# Patient Record
Sex: FEMALE | Race: WHITE | NOT HISPANIC OR LATINO | Employment: PART TIME | URBAN - METROPOLITAN AREA
[De-identification: names, ages, dates, MRNs, and addresses within clinical notes are randomized per-mention and may not be internally consistent; named-entity substitution may affect disease eponyms.]

---

## 2024-04-19 ENCOUNTER — HOSPITAL ENCOUNTER (EMERGENCY)
Facility: CLINIC | Age: 49
Discharge: HOME | End: 2024-04-19
Attending: EMERGENCY MEDICINE | Admitting: EMERGENCY MEDICINE
Payer: COMMERCIAL

## 2024-04-19 VITALS
DIASTOLIC BLOOD PRESSURE: 87 MMHG | TEMPERATURE: 98.4 F | HEART RATE: 76 BPM | HEIGHT: 67 IN | OXYGEN SATURATION: 100 % | WEIGHT: 130 LBS | RESPIRATION RATE: 20 BRPM | SYSTOLIC BLOOD PRESSURE: 133 MMHG | BODY MASS INDEX: 20.4 KG/M2

## 2024-04-19 DIAGNOSIS — R55 NEAR SYNCOPE: ICD-10-CM

## 2024-04-19 DIAGNOSIS — R42 DIZZINESS: ICD-10-CM

## 2024-04-19 PROBLEM — F51.01 PRIMARY INSOMNIA: Status: ACTIVE | Noted: 2022-03-17

## 2024-04-19 PROBLEM — F41.9 ANXIETY: Status: ACTIVE | Noted: 2021-03-04

## 2024-04-19 LAB
ALBUMIN SERPL BCG-MCNC: 4.4 G/DL (ref 3.5–5.2)
ALP SERPL-CCNC: 42 U/L (ref 40–150)
ALT SERPL W P-5'-P-CCNC: 12 U/L (ref 0–50)
ANION GAP SERPL CALCULATED.3IONS-SCNC: 10 MMOL/L (ref 7–15)
AST SERPL W P-5'-P-CCNC: 21 U/L (ref 0–45)
BASOPHILS # BLD AUTO: 0 10E3/UL (ref 0–0.2)
BASOPHILS NFR BLD AUTO: 1 %
BILIRUB SERPL-MCNC: 0.5 MG/DL
BUN SERPL-MCNC: 10.9 MG/DL (ref 6–20)
CALCIUM SERPL-MCNC: 8.9 MG/DL (ref 8.6–10)
CHLORIDE SERPL-SCNC: 107 MMOL/L (ref 98–107)
CREAT SERPL-MCNC: 0.76 MG/DL (ref 0.51–0.95)
DEPRECATED HCO3 PLAS-SCNC: 23 MMOL/L (ref 22–29)
EGFRCR SERPLBLD CKD-EPI 2021: >90 ML/MIN/1.73M2
EOSINOPHIL # BLD AUTO: 0 10E3/UL (ref 0–0.7)
EOSINOPHIL NFR BLD AUTO: 1 %
ERYTHROCYTE [DISTWIDTH] IN BLOOD BY AUTOMATED COUNT: 12.9 % (ref 10–15)
GLUCOSE SERPL-MCNC: 108 MG/DL (ref 70–99)
HCG SERPL QL: NEGATIVE
HCT VFR BLD AUTO: 40.4 % (ref 35–47)
HGB BLD-MCNC: 13.2 G/DL (ref 11.7–15.7)
HOLD SPECIMEN: NORMAL
HOLD SPECIMEN: NORMAL
IMM GRANULOCYTES # BLD: 0 10E3/UL
IMM GRANULOCYTES NFR BLD: 0 %
LYMPHOCYTES # BLD AUTO: 1.4 10E3/UL (ref 0.8–5.3)
LYMPHOCYTES NFR BLD AUTO: 22 %
MAGNESIUM SERPL-MCNC: 2.1 MG/DL (ref 1.7–2.3)
MCH RBC QN AUTO: 27.6 PG (ref 26.5–33)
MCHC RBC AUTO-ENTMCNC: 32.7 G/DL (ref 31.5–36.5)
MCV RBC AUTO: 84 FL (ref 78–100)
MONOCYTES # BLD AUTO: 0.5 10E3/UL (ref 0–1.3)
MONOCYTES NFR BLD AUTO: 8 %
NEUTROPHILS # BLD AUTO: 4.4 10E3/UL (ref 1.6–8.3)
NEUTROPHILS NFR BLD AUTO: 69 %
NRBC # BLD AUTO: 0 10E3/UL
NRBC BLD AUTO-RTO: 0 /100
PLATELET # BLD AUTO: 192 10E3/UL (ref 150–450)
POTASSIUM SERPL-SCNC: 3.8 MMOL/L (ref 3.4–5.3)
PROT SERPL-MCNC: 6.5 G/DL (ref 6.4–8.3)
RBC # BLD AUTO: 4.79 10E6/UL (ref 3.8–5.2)
SODIUM SERPL-SCNC: 140 MMOL/L (ref 135–145)
TSH SERPL DL<=0.005 MIU/L-ACNC: 1.03 UIU/ML (ref 0.3–4.2)
WBC # BLD AUTO: 6.3 10E3/UL (ref 4–11)

## 2024-04-19 PROCEDURE — 93010 ELECTROCARDIOGRAM REPORT: CPT | Performed by: EMERGENCY MEDICINE

## 2024-04-19 PROCEDURE — 93005 ELECTROCARDIOGRAM TRACING: CPT

## 2024-04-19 PROCEDURE — 84443 ASSAY THYROID STIM HORMONE: CPT | Performed by: EMERGENCY MEDICINE

## 2024-04-19 PROCEDURE — 85048 AUTOMATED LEUKOCYTE COUNT: CPT | Performed by: FAMILY MEDICINE

## 2024-04-19 PROCEDURE — 36415 COLL VENOUS BLD VENIPUNCTURE: CPT | Performed by: FAMILY MEDICINE

## 2024-04-19 PROCEDURE — 83735 ASSAY OF MAGNESIUM: CPT | Performed by: EMERGENCY MEDICINE

## 2024-04-19 PROCEDURE — 99284 EMERGENCY DEPT VISIT MOD MDM: CPT | Mod: 25 | Performed by: EMERGENCY MEDICINE

## 2024-04-19 PROCEDURE — 99284 EMERGENCY DEPT VISIT MOD MDM: CPT

## 2024-04-19 PROCEDURE — 84703 CHORIONIC GONADOTROPIN ASSAY: CPT | Performed by: EMERGENCY MEDICINE

## 2024-04-19 PROCEDURE — 80053 COMPREHEN METABOLIC PANEL: CPT | Performed by: FAMILY MEDICINE

## 2024-04-19 ASSESSMENT — ACTIVITIES OF DAILY LIVING (ADL)
ADLS_ACUITY_SCORE: 35
ADLS_ACUITY_SCORE: 35

## 2024-04-19 ASSESSMENT — COLUMBIA-SUICIDE SEVERITY RATING SCALE - C-SSRS
2. HAVE YOU ACTUALLY HAD ANY THOUGHTS OF KILLING YOURSELF IN THE PAST MONTH?: NO
6. HAVE YOU EVER DONE ANYTHING, STARTED TO DO ANYTHING, OR PREPARED TO DO ANYTHING TO END YOUR LIFE?: NO
1. IN THE PAST MONTH, HAVE YOU WISHED YOU WERE DEAD OR WISHED YOU COULD GO TO SLEEP AND NOT WAKE UP?: NO

## 2024-04-19 NOTE — DISCHARGE INSTRUCTIONS
Your evaluation in the emergency department is reassuring however no cause of your symptoms was identified.  I would like you to follow-up with your primary care provider as we discussed.  If this continues and your heart rate continues to be elevated you may benefit from an outpatient heart monitor which can be worn for several days.  Please return to the emergency department for new or worsening symptoms.  Hope you feel better soon.

## 2024-04-19 NOTE — ED PROVIDER NOTES
History     Chief Complaint   Patient presents with    Dizziness     HPI  Sage Tripathi is a 49 year old female with history of ADHD, anxiety, insomnia, who presents for evaluation of dizziness.  She reports that while she was driving she had a very acute onset of dizziness and felt lightheaded and as if she may pass out.  Felt panicky and anxious and was breathing very fast.  Then was having a tingly feeling in her hands and feet followed by cramping in her hands.  No nausea or vomiting or diaphoresis.  Felt clammy and looked pale in the mirror.  This is never happened before.  Lasted for about 20 minutes.  She was able to pull over to the side of the road and rest.  Still did not feel great but symptoms had lessened.  Was speaking with her boyfriend on the phone and trying to get back on the highway to get an exit to get off the highway.  So she got back on the highway symptoms worsened again and then ultimately called 911 and was brought in by EMS.  Was in her usual state of health at time of onset.  Denies any recent illness.  She takes Adderall and hydroxyzine as needed as well as some vitamin supplements.  No changes in this regimen recently.  She has a smart watch and this did recorded during this time her heart rate was elevated from 100-142.  She says that this corresponded to the time of her symptoms.    The patient's PMHx, Surgical Hx, Allergies, and Medications were all reviewed with the patient.    Allergies:  No Known Allergies    Problem List:    Patient Active Problem List    Diagnosis Date Noted    Primary insomnia 03/17/2022     Priority: Medium     Formatting of this note might be different from the original.   Last Assessment & Plan:     Formatting of this note might be different from the original.    The patient uses hydroxyzine 25 mg at bedtime for treatment of insomnia, no side effects.  At times she will use 50 mg of hydroxyzine.  Hydroxyzine works well to help her sleep.  Will refill  hydroxyzine today.  Return to clinic in 1 year for medication management, sooner if concerns arise.  Last Assessment & Plan:    Formatting of this note might be different from the original.   The patient uses hydroxyzine 25 mg at bedtime for treatment of insomnia, no side effects.  At times she will use 50 mg of hydroxyzine.  Hydroxyzine works well to help her sleep.  Will refill hydroxyzine today.  Return to clinic in 1 year for medication management, sooner if concerns arise.      Anxiety 03/04/2021     Priority: Medium    ADHD, predominantly inattentive type 08/10/2013     Priority: Medium     Formatting of this note might be different from the original.   Formatting of this note might be different from the original.    Legacy Onset Date: 07/28/2011       Last Assessment & Plan:     Formatting of this note might be different from the original.    ADHD symptoms are in good control with Adderall 10 mg BID. She is tolerating the medication well. She took the medication this morning and her BP and HR are stable. The pt states that she has quit marijuana since starting Adderall.  Stimulant agreement signed Jan 2024. Urine drug screen today shows expected results (positive amphetamine, everything else negative).  Will refill Adderall at its current dose regimen for the next 6 months.  Return to clinic in 6 months for medication management, sooner if concerns arise.  Formatting of this note might be different from the original.   Legacy Onset Date: 07/28/2011      Last Assessment & Plan:    Formatting of this note might be different from the original.   ADHD symptoms are in good control with Adderall 10 mg BID. She is tolerating the medication well. She took the medication this morning and her BP and HR are stable. The pt states that she has quit marijuana since starting Adderall.  Stimulant agreement signed Jan 2024. Urine drug screen today shows expected results (positive amphetamine, everything else negative).  Will  "refill Adderall at its current dose regimen for the next 6 months.  Return to clinic in 6 months for medication management, sooner if concerns arise.      Alcohol dependence in remission (H) 02/09/2009     Priority: Medium     Formatting of this note might be different from the original.   Formatting of this note might be different from the original.   Alcohol Dependence Remission          Past Medical History:    No past medical history on file.    Past Surgical History:    No past surgical history on file.    Family History:    No family history on file.    Social History:  Marital Status:          Medications:    No current outpatient medications on file.        Review of Systems  Pertinent positives and negatives mentioned in HPI    Physical Exam   BP: 133/87  Pulse: 78  Temp: 98.4  F (36.9  C)  Resp: 17  Height: 170.2 cm (5' 7\")  Weight: 59 kg (130 lb)  SpO2: 99 %    GEN: Awake, alert, and cooperative.  Appears anxious.  HENT: Atraumatic  EYES: EOM intact. Conjunctiva clear. No discharge.  No nystagmus.  No RAPD.  NECK: Symmetric, freely mobile.   CV : Regular rate and rhythm.  No murmurs appreciated  PULM: Normal effort. No wheezes, rales, or rhonchi bilaterally.  ABD: Soft, non-tender, non-distended. No rebound or guarding.   NEURO: Mental status: Alert, awake. Oriented to self, date, and place. Normal speech and language. GCS 15  Cranial Nerves: II-XII grossly intact  Motor: Follows commands x 4 extremities   Upper Extremities:   RUE: 5/5 shoulder abduction. 5/5 elbow flex/ext. 5/5 wrist flex/ext. 5/5 hand .   LUE: 5/5 shoulder abduction. 5/5 elbow flex/ext. 5/5 wrist flex/ext. 5/5 hand .    Lower Extremities:   RLE: 5/5 hip flexion. 5/5 knee flex/ext. 5/5 ankle plantar-/dorsiflexion. 5/5 EHL.   LLE: 5/5 hip flexion. 5/5 knee flex/ext. 5/5 ankle plantar-/dorsiflexion. 5/5 EHL   Sensory: Sensation intact in all 4 extremities   Coordination: Finger-nose finger intact. Heel-shin intact.   EXT: No gross " deformity. Warm and well perfused.  INT: Warm. No diaphoresis. Normal color.        ED Course        Procedures         Sinus rhythm with rate of 76 bpm.  Normal axis.  Delayed R wave progression.  Biphasic P wave in lead V2 makes me suspect leads placed to superiorly.  No ST segment elevations or depressions.  Normal intervals.  No ectopy.  No prior EKG for comparison.  Impression sinus rhythm with no evidence of dysrhythmia.       Critical Care time:  none               Results for orders placed or performed during the hospital encounter of 04/19/24 (from the past 24 hour(s))   CBC with platelets, differential    Narrative    The following orders were created for panel order CBC with platelets, differential.  Procedure                               Abnormality         Status                     ---------                               -----------         ------                     CBC with platelets and d...[[id number]]                      Final result                 Please view results for these tests on the individual orders.   Comprehensive metabolic panel   Result Value Ref Range    Sodium 140 135 - 145 mmol/L    Potassium 3.8 3.4 - 5.3 mmol/L    Carbon Dioxide (CO2) 23 22 - 29 mmol/L    Anion Gap 10 7 - 15 mmol/L    Urea Nitrogen 10.9 6.0 - 20.0 mg/dL    Creatinine 0.76 0.51 - 0.95 mg/dL    GFR Estimate >90 >60 mL/min/1.73m2    Calcium 8.9 8.6 - 10.0 mg/dL    Chloride 107 98 - 107 mmol/L    Glucose 108 (H) 70 - 99 mg/dL    Alkaline Phosphatase 42 40 - 150 U/L    AST 21 0 - 45 U/L    ALT 12 0 - 50 U/L    Protein Total 6.5 6.4 - 8.3 g/dL    Albumin 4.4 3.5 - 5.2 g/dL    Bilirubin Total 0.5 <=1.2 mg/dL   [place] Draw    Narrative    The following orders were created for panel order [place] Draw.  Procedure                               Abnormality         Status                     ---------                               -----------         ------                     Extra Blue Top Tube[[id number]]                               Final result               Extra Red Top Tube[[id number]]                               Final result                 Please view results for these tests on the individual orders.   CBC with platelets and differential   Result Value Ref Range    WBC Count 6.3 4.0 - 11.0 10e3/uL    RBC Count 4.79 3.80 - 5.20 10e6/uL    Hemoglobin 13.2 11.7 - 15.7 g/dL    Hematocrit 40.4 35.0 - 47.0 %    MCV 84 78 - 100 fL    MCH 27.6 26.5 - 33.0 pg    MCHC 32.7 31.5 - 36.5 g/dL    RDW 12.9 10.0 - 15.0 %    Platelet Count 192 150 - 450 10e3/uL    % Neutrophils 69 %    % Lymphocytes 22 %    % Monocytes 8 %    % Eosinophils 1 %    % Basophils 1 %    % Immature Granulocytes 0 %    NRBCs per 100 WBC 0 <1 /100    Absolute Neutrophils 4.4 1.6 - 8.3 10e3/uL    Absolute Lymphocytes 1.4 0.8 - 5.3 10e3/uL    Absolute Monocytes 0.5 0.0 - 1.3 10e3/uL    Absolute Eosinophils 0.0 0.0 - 0.7 10e3/uL    Absolute Basophils 0.0 0.0 - 0.2 10e3/uL    Absolute Immature Granulocytes 0.0 <=0.4 10e3/uL    Absolute NRBCs 0.0 10e3/uL   Extra Blue Top Tube   Result Value Ref Range    Hold Specimen JIC    Extra Red Top Tube   Result Value Ref Range    Hold Specimen jic    Magnesium   Result Value Ref Range    Magnesium 2.1 1.7 - 2.3 mg/dL   TSH with free T4 reflex   Result Value Ref Range    TSH 1.03 0.30 - 4.20 uIU/mL   HCG qualitative pregnancy (blood)   Result Value Ref Range    hCG Serum Qualitative Negative Negative       Medications - No data to display    Assessments & Plan (with Medical Decision Making)   49 year old female with past medical history of ADHD, anxiety, who was brought in by EMS for episodic dizziness and near syncope detailed in HPI.  Very abrupt onset.  No dizziness at this time. Appears anxious and fatigued. Reassuring neuro exam. Sinus rhythm on room monitor w/ normal rate. No murmurs appreciated.     EKG reassuring and no signs of dysrhythmia.  My suspicion is that her elevated heart rate which is recorded on her smart watch  was a result of the episode but no way to know for sure.  If this continues.  Did discuss possibility of having an outpatient Holter monitor which can be arranged through her primary care provider.  I would like her to follow-up with her primary care provider regardless.  CBC was normal.  hCG negative.  Magnesium normal at 2.1.  Normal TSH at 1.03.  Comprehensive metabolic panel grossly normal, blood glucose 108 (not fasting).  No recurrence of symptoms while in the department.  Able to ambulate independently in hallway and tolerating oral fluids.  Etiology of her symptoms earlier unclear.  Could be vasovagal vagal events could be tacky dysrhythmia.  Given reassuring evaluation I think she is okay for discharge.  Follow-up ED return precautions were discussed.  She is comfortable being discharged to home.    I have reviewed the nursing notes.         New Prescriptions    No medications on file       Final diagnoses:   Near syncope   Dizziness     Benton Riley MD        4/19/2024   [facility] EMERGENCY DEPT    Disclaimer: This note consists of words and symbols derived from keyboarding and dictation using voice recognition software.  As a result, there may be errors that have gone undetected.  Please consider this when interpreting information found in this note.               Benton Riley MD  04/19/24 2716

## 2024-04-19 NOTE — ED TRIAGE NOTES
Pt was driving on freeway and had a dizzy spell so pt pulled over. Dizzy spell passed and when pt got back in the car the dizziness came back and EMS was phoned. Pt states the dizziness lasted 20 minutes and started hyperventilating. Pt denies pain, n/v during episode. Pt reports slight relief from dizziness when she looked down at her phone.

## 2024-04-19 NOTE — ED TRIAGE NOTES
Triage Assessment (Adult)       Row Name 04/19/24 1229          Triage Assessment    Airway WDL WDL        Respiratory WDL    Respiratory WDL WDL        Skin Circulation/Temperature WDL    Skin Circulation/Temperature WDL WDL        Cardiac WDL    Cardiac WDL WDL        Peripheral/Neurovascular WDL    Peripheral Neurovascular WDL WDL        Cognitive/Neuro/Behavioral WDL    Cognitive/Neuro/Behavioral WDL WDL

## 2025-04-19 ENCOUNTER — HEALTH MAINTENANCE LETTER (OUTPATIENT)
Age: 50
End: 2025-04-19